# Patient Record
Sex: FEMALE | Race: WHITE
[De-identification: names, ages, dates, MRNs, and addresses within clinical notes are randomized per-mention and may not be internally consistent; named-entity substitution may affect disease eponyms.]

---

## 2020-10-25 ENCOUNTER — HOSPITAL ENCOUNTER (EMERGENCY)
Dept: HOSPITAL 95 - ER | Age: 70
Discharge: HOME | End: 2020-10-25
Payer: COMMERCIAL

## 2020-10-25 VITALS — WEIGHT: 150 LBS | BODY MASS INDEX: 24.11 KG/M2 | HEIGHT: 66 IN

## 2020-10-25 DIAGNOSIS — Z87.891: ICD-10-CM

## 2020-10-25 DIAGNOSIS — Z79.4: ICD-10-CM

## 2020-10-25 DIAGNOSIS — Z88.0: ICD-10-CM

## 2020-10-25 DIAGNOSIS — Z79.899: ICD-10-CM

## 2020-10-25 DIAGNOSIS — S52.502A: Primary | ICD-10-CM

## 2020-10-25 DIAGNOSIS — E11.9: ICD-10-CM
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
## 2020-10-25 DIAGNOSIS — S52.512A: ICD-10-CM

## 2020-10-25 DIAGNOSIS — Z88.2: ICD-10-CM

## 2020-10-25 PROCEDURE — A9270 NON-COVERED ITEM OR SERVICE: HCPCS

## 2020-10-29 ENCOUNTER — HOSPITAL ENCOUNTER (OUTPATIENT)
Dept: HOSPITAL 95 - ORSCSDS | Age: 70
Discharge: HOME | End: 2020-10-29
Attending: ORTHOPAEDIC SURGERY
Payer: COMMERCIAL

## 2020-10-29 VITALS — BODY MASS INDEX: 23.49 KG/M2 | WEIGHT: 146.17 LBS | HEIGHT: 65.98 IN

## 2020-10-29 DIAGNOSIS — G56.02: ICD-10-CM

## 2020-10-29 DIAGNOSIS — E11.9: ICD-10-CM

## 2020-10-29 DIAGNOSIS — S52.572A: Primary | ICD-10-CM

## 2020-10-29 DIAGNOSIS — E03.9: ICD-10-CM

## 2020-10-29 DIAGNOSIS — Z79.899: ICD-10-CM

## 2020-10-29 DIAGNOSIS — Z79.84: ICD-10-CM

## 2020-10-29 DIAGNOSIS — Z79.4: ICD-10-CM

## 2020-10-29 PROCEDURE — C1713 ANCHOR/SCREW BN/BN,TIS/BN: HCPCS

## 2020-10-29 PROCEDURE — 01N50ZZ RELEASE MEDIAN NERVE, OPEN APPROACH: ICD-10-PCS | Performed by: ORTHOPAEDIC SURGERY

## 2020-10-29 PROCEDURE — A9270 NON-COVERED ITEM OR SERVICE: HCPCS

## 2020-10-29 PROCEDURE — 0PSJ04Z REPOSITION LEFT RADIUS WITH INTERNAL FIXATION DEVICE, OPEN APPROACH: ICD-10-PCS | Performed by: ORTHOPAEDIC SURGERY

## 2020-10-29 NOTE — NUR
10/29/20 0914 KRISTINA RAMIREZ
AARON NOTED , DR. HANSEN AND  NOTIFIED, VERBAL ORDER
OBTAINED BY DR. HERNANDEZ FOR 5 UNITS HUMULIN R SUB Q X1, MEDICATION
ADMINISTERED PER ORDERS

## 2021-07-29 ENCOUNTER — HOSPITAL ENCOUNTER (OUTPATIENT)
Dept: HOSPITAL 95 - ORSCSDS | Age: 71
Discharge: HOME | End: 2021-07-29
Attending: OPHTHALMOLOGY
Payer: COMMERCIAL

## 2021-07-29 VITALS — HEIGHT: 66 IN | BODY MASS INDEX: 22.75 KG/M2 | WEIGHT: 141.54 LBS

## 2021-07-29 DIAGNOSIS — Z87.891: ICD-10-CM

## 2021-07-29 DIAGNOSIS — B19.20: ICD-10-CM

## 2021-07-29 DIAGNOSIS — H25.11: Primary | ICD-10-CM

## 2021-07-29 DIAGNOSIS — Z79.4: ICD-10-CM

## 2021-07-29 DIAGNOSIS — K21.9: ICD-10-CM

## 2021-07-29 DIAGNOSIS — E11.9: ICD-10-CM

## 2021-07-29 DIAGNOSIS — F41.8: ICD-10-CM

## 2021-07-29 DIAGNOSIS — Z79.899: ICD-10-CM

## 2021-07-29 PROCEDURE — 08RJ3JZ REPLACEMENT OF RIGHT LENS WITH SYNTHETIC SUBSTITUTE, PERCUTANEOUS APPROACH: ICD-10-PCS | Performed by: OPHTHALMOLOGY

## 2021-07-29 PROCEDURE — V2632 POST CHMBR INTRAOCULAR LENS: HCPCS

## 2021-08-12 ENCOUNTER — HOSPITAL ENCOUNTER (OUTPATIENT)
Dept: HOSPITAL 95 - ORSCSDS | Age: 71
Discharge: HOME | End: 2021-08-12
Attending: OPHTHALMOLOGY
Payer: COMMERCIAL

## 2021-08-12 VITALS — BODY MASS INDEX: 22.57 KG/M2 | WEIGHT: 140.43 LBS | HEIGHT: 66 IN

## 2021-08-12 DIAGNOSIS — F41.8: ICD-10-CM

## 2021-08-12 DIAGNOSIS — H25.12: Primary | ICD-10-CM

## 2021-08-12 DIAGNOSIS — Z87.891: ICD-10-CM

## 2021-08-12 DIAGNOSIS — Z79.84: ICD-10-CM

## 2021-08-12 DIAGNOSIS — Z79.899: ICD-10-CM

## 2021-08-12 DIAGNOSIS — K21.9: ICD-10-CM

## 2021-08-12 DIAGNOSIS — E11.9: ICD-10-CM

## 2021-08-12 PROCEDURE — 08RK3JZ REPLACEMENT OF LEFT LENS WITH SYNTHETIC SUBSTITUTE, PERCUTANEOUS APPROACH: ICD-10-PCS | Performed by: OPHTHALMOLOGY

## 2021-08-12 PROCEDURE — V2632 POST CHMBR INTRAOCULAR LENS: HCPCS

## 2022-03-31 ENCOUNTER — HOSPITAL ENCOUNTER (EMERGENCY)
Dept: HOSPITAL 95 - ER | Age: 72
Discharge: HOME | End: 2022-03-31
Payer: COMMERCIAL

## 2022-03-31 VITALS — WEIGHT: 145.99 LBS | BODY MASS INDEX: 23.46 KG/M2 | HEIGHT: 66 IN

## 2022-03-31 DIAGNOSIS — S41.111A: ICD-10-CM

## 2022-03-31 DIAGNOSIS — Z88.2: ICD-10-CM

## 2022-03-31 DIAGNOSIS — Z79.84: ICD-10-CM

## 2022-03-31 DIAGNOSIS — Z79.4: ICD-10-CM

## 2022-03-31 DIAGNOSIS — R55: Primary | ICD-10-CM

## 2022-03-31 DIAGNOSIS — Z87.891: ICD-10-CM

## 2022-03-31 DIAGNOSIS — Z88.0: ICD-10-CM
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
## 2022-03-31 DIAGNOSIS — Z88.8: ICD-10-CM

## 2022-03-31 DIAGNOSIS — Z79.899: ICD-10-CM

## 2022-03-31 DIAGNOSIS — K92.2: ICD-10-CM

## 2022-03-31 DIAGNOSIS — D64.9: ICD-10-CM

## 2022-03-31 LAB
ALBUMIN SERPL BCP-MCNC: 2.7 G/DL (ref 3.4–5)
ALBUMIN/GLOB SERPL: 0.7 {RATIO} (ref 0.8–1.8)
ALT SERPL W P-5'-P-CCNC: 33 U/L (ref 12–78)
ANION GAP SERPL CALCULATED.4IONS-SCNC: 7 MMOL/L (ref 6–16)
AST SERPL W P-5'-P-CCNC: 34 U/L (ref 12–37)
BASOPHILS # BLD AUTO: 0.01 K/MM3 (ref 0–0.23)
BASOPHILS NFR BLD AUTO: 0 % (ref 0–2)
BILIRUB SERPL-MCNC: 0.6 MG/DL (ref 0.1–1)
BUN SERPL-MCNC: 12 MG/DL (ref 8–24)
CALCIUM SERPL-MCNC: 8.4 MG/DL (ref 8.5–10.1)
CHLORIDE SERPL-SCNC: 101 MMOL/L (ref 98–108)
CO2 SERPL-SCNC: 25 MMOL/L (ref 21–32)
CREAT SERPL-MCNC: 0.74 MG/DL (ref 0.4–1)
DEPRECATED RDW RBC AUTO: 41.5 FL (ref 35.1–46.3)
EOSINOPHIL # BLD AUTO: 0.05 K/MM3 (ref 0–0.68)
EOSINOPHIL NFR BLD AUTO: 2 % (ref 0–6)
ERYTHROCYTE [DISTWIDTH] IN BLOOD BY AUTOMATED COUNT: 12.8 % (ref 11.7–14.2)
FLUAV RNA SPEC QL NAA+PROBE: NEGATIVE
FLUBV RNA SPEC QL NAA+PROBE: NEGATIVE
GLOBULIN SER CALC-MCNC: 4.1 G/DL (ref 2.2–4)
GLUCOSE SERPL-MCNC: 253 MG/DL (ref 70–99)
HCT VFR BLD AUTO: 28.3 % (ref 33–51)
HGB BLD-MCNC: 9.5 G/DL (ref 11.5–16)
IMM GRANULOCYTES # BLD AUTO: 0.03 K/MM3 (ref 0–0.1)
IMM GRANULOCYTES NFR BLD AUTO: 1 % (ref 0–1)
LYMPHOCYTES # BLD AUTO: 0.53 K/MM3 (ref 0.84–5.2)
LYMPHOCYTES NFR BLD AUTO: 21 % (ref 21–46)
MCHC RBC AUTO-ENTMCNC: 33.6 G/DL (ref 31.5–36.5)
MCV RBC AUTO: 94 FL (ref 80–100)
MONOCYTES # BLD AUTO: 1.46 K/MM3 (ref 0.16–1.47)
MONOCYTES NFR BLD AUTO: 57 % (ref 4–13)
NEUTROPHILS # BLD AUTO: 0.5 K/MM3 (ref 1.96–9.15)
NEUTROPHILS NFR BLD AUTO: 19 % (ref 41–73)
NRBC # BLD AUTO: 0 K/MM3 (ref 0–0.02)
NRBC BLD AUTO-RTO: 0 /100 WBC (ref 0–0.2)
PLATELET # BLD AUTO: 107 K/MM3 (ref 150–400)
POTASSIUM SERPL-SCNC: 4.2 MMOL/L (ref 3.5–5.5)
PROT SERPL-MCNC: 6.8 G/DL (ref 6.4–8.2)
RSV RNA SPEC QL NAA+PROBE: NEGATIVE
SARS-COV-2 RNA RESP QL NAA+PROBE: NEGATIVE
SODIUM SERPL-SCNC: 133 MMOL/L (ref 136–145)

## 2022-03-31 PROCEDURE — C9113 INJ PANTOPRAZOLE SODIUM, VIA: HCPCS

## 2022-04-18 ENCOUNTER — HOSPITAL ENCOUNTER (OUTPATIENT)
Dept: HOSPITAL 95 - LAB SHORT | Age: 72
Discharge: HOME | End: 2022-04-18
Attending: NURSE PRACTITIONER
Payer: COMMERCIAL

## 2022-04-18 ENCOUNTER — HOSPITAL ENCOUNTER (OUTPATIENT)
Dept: HOSPITAL 95 - ER | Age: 72
Setting detail: OBSERVATION
LOS: 2 days | Discharge: HOME | End: 2022-04-20
Attending: INTERNAL MEDICINE | Admitting: HOSPITALIST
Payer: COMMERCIAL

## 2022-04-18 VITALS — BODY MASS INDEX: 23.1 KG/M2 | WEIGHT: 143.74 LBS | HEIGHT: 66 IN

## 2022-04-18 DIAGNOSIS — B19.20: ICD-10-CM

## 2022-04-18 DIAGNOSIS — K74.69: ICD-10-CM

## 2022-04-18 DIAGNOSIS — Z88.0: ICD-10-CM

## 2022-04-18 DIAGNOSIS — E78.5: ICD-10-CM

## 2022-04-18 DIAGNOSIS — L08.9: Primary | ICD-10-CM

## 2022-04-18 DIAGNOSIS — E03.9: ICD-10-CM

## 2022-04-18 DIAGNOSIS — F41.9: ICD-10-CM

## 2022-04-18 DIAGNOSIS — E87.1: ICD-10-CM

## 2022-04-18 DIAGNOSIS — E11.9: ICD-10-CM

## 2022-04-18 DIAGNOSIS — K62.5: ICD-10-CM

## 2022-04-18 DIAGNOSIS — Z88.2: ICD-10-CM

## 2022-04-18 DIAGNOSIS — K60.2: Primary | ICD-10-CM

## 2022-04-18 DIAGNOSIS — K64.4: ICD-10-CM

## 2022-04-18 DIAGNOSIS — Z88.8: ICD-10-CM

## 2022-04-18 DIAGNOSIS — Z79.4: ICD-10-CM

## 2022-04-18 DIAGNOSIS — D50.0: ICD-10-CM

## 2022-04-18 DIAGNOSIS — Z87.891: ICD-10-CM

## 2022-04-18 DIAGNOSIS — I10: ICD-10-CM

## 2022-04-18 LAB
ALBUMIN SERPL BCP-MCNC: 2.1 G/DL (ref 3.4–5)
ALBUMIN/GLOB SERPL: 0.3 {RATIO} (ref 0.8–1.8)
ALT SERPL W P-5'-P-CCNC: 17 U/L (ref 12–78)
ANION GAP SERPL CALCULATED.4IONS-SCNC: 5 MMOL/L (ref 6–16)
AST SERPL W P-5'-P-CCNC: 31 U/L (ref 12–37)
BASOPHILS # BLD: 0 K/MM3 (ref 0–0.23)
BASOPHILS NFR BLD: 0 % (ref 0–2)
BILIRUB SERPL-MCNC: 1.1 MG/DL (ref 0.1–1)
BUN SERPL-MCNC: 15 MG/DL (ref 8–24)
CALCIUM SERPL-MCNC: 7.9 MG/DL (ref 8.5–10.1)
CHLORIDE SERPL-SCNC: 99 MMOL/L (ref 98–108)
CO2 SERPL-SCNC: 23 MMOL/L (ref 21–32)
CREAT SERPL-MCNC: 0.82 MG/DL (ref 0.4–1)
DEPRECATED RDW RBC AUTO: (no result) FL (ref 35.1–46.3)
EOSINOPHIL # BLD: 0 K/MM3 (ref 0–0.68)
EOSINOPHIL NFR BLD: 0 % (ref 0–6)
ERYTHROCYTE [DISTWIDTH] IN BLOOD BY AUTOMATED COUNT: (no result) % (ref 11.7–14.2)
GLOBULIN SER CALC-MCNC: 6.7 G/DL (ref 2.2–4)
GLUCOSE SERPL-MCNC: 120 MG/DL (ref 70–99)
HCT VFR BLD AUTO: 21.2 % (ref 33–51)
HGB BLD-MCNC: 7.5 G/DL (ref 11.5–16)
IMM GRANULOCYTES # BLD AUTO: 0.58 K/MM3 (ref 0–0.1)
IMM GRANULOCYTES NFR BLD AUTO: 9 % (ref 0–1)
LYMPHOCYTES # BLD: 0.89 K/MM3 (ref 0.84–5.2)
LYMPHOCYTES NFR BLD: 12 % (ref 21–46)
MCHC RBC AUTO-ENTMCNC: 35.4 G/DL (ref 31.5–36.5)
MCV RBC AUTO: 101 FL (ref 80–100)
METAMYELOCYTES # BLD MANUAL: 0.25 K/MM3 (ref 0–0)
METAMYELOCYTES NFR BLD MANUAL: 4 % (ref 0–0)
MONOCYTES # BLD: 0.5 K/MM3 (ref 0.16–1.47)
MONOCYTES NFR BLD: 8 % (ref 4–13)
MYELOCYTES # BLD MANUAL: 0.44 K/MM3 (ref 0–0)
MYELOCYTES NFR BLD MANUAL: 7 % (ref 0–0)
NEUTS BAND NFR BLD MANUAL: 5 % (ref 0–8)
NEUTS SEG # BLD MANUAL: 4.2 K/MM3 (ref 1.96–9.15)
NEUTS SEG NFR BLD MANUAL: 61 % (ref 41–73)
NRBC # BLD AUTO: 0 K/MM3 (ref 0–0.02)
NRBC BLD AUTO-RTO: 0 /100 WBC (ref 0–0.2)
PLATELET # BLD AUTO: 120 K/MM3 (ref 150–400)
POTASSIUM SERPL-SCNC: 4.5 MMOL/L (ref 3.5–5.5)
PROMYELOCYTES # BLD MANUAL: 0.06 K/MM3 (ref 0–0)
PROMYELOCYTES NFR BLD MANUAL: 1 % (ref 0–0)
PROT SERPL-MCNC: 8.8 G/DL (ref 6.4–8.2)
SODIUM SERPL-SCNC: 127 MMOL/L (ref 136–145)
TOTAL CELLS COUNTED BLD: 100
VARIANT LYMPHS NFR BLD MANUAL: 2 % (ref 0–0)

## 2022-04-18 PROCEDURE — A9270 NON-COVERED ITEM OR SERVICE: HCPCS

## 2022-04-18 PROCEDURE — G0378 HOSPITAL OBSERVATION PER HR: HCPCS

## 2022-04-18 PROCEDURE — C9113 INJ PANTOPRAZOLE SODIUM, VIA: HCPCS

## 2022-04-18 PROCEDURE — P9016 RBC LEUKOCYTES REDUCED: HCPCS

## 2022-04-19 LAB
ALBUMIN SERPL BCP-MCNC: 1.9 G/DL (ref 3.4–5)
ALBUMIN/GLOB SERPL: 0.3 {RATIO} (ref 0.8–1.8)
ALT SERPL W P-5'-P-CCNC: 15 U/L (ref 12–78)
ANION GAP SERPL CALCULATED.4IONS-SCNC: 6 MMOL/L (ref 6–16)
AST SERPL W P-5'-P-CCNC: 31 U/L (ref 12–37)
BILIRUB SERPL-MCNC: 1.1 MG/DL (ref 0.1–1)
BUN SERPL-MCNC: 12 MG/DL (ref 8–24)
CALCIUM SERPL-MCNC: 7.4 MG/DL (ref 8.5–10.1)
CHLORIDE SERPL-SCNC: 102 MMOL/L (ref 98–108)
CO2 SERPL-SCNC: 22 MMOL/L (ref 21–32)
CREAT SERPL-MCNC: 0.8 MG/DL (ref 0.4–1)
DEPRECATED RDW RBC AUTO: 44.1 FL (ref 35.1–46.3)
ERYTHROCYTE [DISTWIDTH] IN BLOOD BY AUTOMATED COUNT: 18.6 % (ref 11.7–14.2)
FLUAV RNA SPEC QL NAA+PROBE: NEGATIVE
FLUBV RNA SPEC QL NAA+PROBE: NEGATIVE
GLOBULIN SER CALC-MCNC: 6.1 G/DL (ref 2.2–4)
GLUCOSE SERPL-MCNC: 45 MG/DL (ref 70–99)
HCT VFR BLD AUTO: 19.8 % (ref 33–51)
HCT VFR BLD AUTO: 20.7 % (ref 33–51)
HGB BLD-MCNC: 6.9 G/DL (ref 11.5–16)
HGB BLD-MCNC: 7 G/DL (ref 11.5–16)
MCHC RBC AUTO-ENTMCNC: 33.8 G/DL (ref 31.5–36.5)
MCV RBC AUTO: 100 FL (ref 80–100)
NRBC # BLD AUTO: 0 K/MM3 (ref 0–0.02)
NRBC BLD AUTO-RTO: 0 /100 WBC (ref 0–0.2)
PLATELET # BLD AUTO: 121 K/MM3 (ref 150–400)
POTASSIUM SERPL-SCNC: 4.3 MMOL/L (ref 3.5–5.5)
PROT SERPL-MCNC: 8 G/DL (ref 6.4–8.2)
PROTHROMBIN TIME: 13.4 SEC (ref 9.7–11.5)
RSV RNA SPEC QL NAA+PROBE: NEGATIVE
SARS-COV-2 RNA RESP QL NAA+PROBE: NEGATIVE
SODIUM SERPL-SCNC: 130 MMOL/L (ref 136–145)

## 2022-04-19 PROCEDURE — 0DBP8ZX EXCISION OF RECTUM, VIA NATURAL OR ARTIFICIAL OPENING ENDOSCOPIC, DIAGNOSTIC: ICD-10-PCS | Performed by: INTERNAL MEDICINE

## 2022-04-19 NOTE — NUR
SHIFT SUMMARY
PATIENT IS AOX4, AMBULATES SBA TO THE BATHROOM. HERE FOR GI BLEED, GO LEONARD
STARTED @ 0830, FOR COLONOSCOPY. NOTIFIED DR. MEJIA @ NOON OF CLEAR STOOLS.
PATIENT MADE NPO FOR PROCEDURE. CBG CHECK AT 1630 WAS 31, DR. WATERS NOTIFIED.
NEW ORDERS FOR D50 1AMP NOW. PHARMACY CORRECTED DUE TO SHORTAGE. MEDICATION
GIVEN, RECHECK . PATIENT TAKEN TO PROCEDURE @1745. PATIENT IS PLEASANT
AND COOPERATIVE WITH CARE.

## 2022-04-19 NOTE — NUR
Rn summary:  Patient is alert and oriented.  Pt oriented and settled into
room.  Patient had a decub on her coccyx, states it is not from lying in bed,
she states she has a " sariah butt". Wound cleaned, mepilex applied.  Pt does
c/o some pain along her left side, back muscles to her shoulder. She  does not
take pain meds for that.  Pt has not had any bloody stools reported since
admit.  Lungs are clear.  Tele shows SR rate 97.  This am at 0615 report of
blood sugar of 45 received.  Pt given jello and juice, tolerated well.  Repeat
finger stick was 91 at 0645.  Plan is for colonoscopy this evening.  Pt
medicated with ativan 0.5mg for anxiety and muscle pain.  Pt was able to rest
between inturruptions.  Dr. Garcia was notified that pt had a difficult blood
type to cross match to.  It is a special send out and will most likely be a
high risk infusion. Oncoming shift notified.  Pt may need follow with social
services for help with  who has a brain mass.

## 2022-04-19 NOTE — NUR
PT TRANSFERED TO [facility] VIA GURNY FROM FLOOR.
History, Chart, Medications and Allergies reviewed before start of
procedure. Lungs clear T/O to Auscultation.
Patient confirms NPO status and agrees with scheduled surgery.
Pre-Op teaching done. Pt verbalizes understanding.

## 2022-04-19 NOTE — NUR
04/19/22 1804 WILLIAN SHINE
History, Chart, Medications and Allergies reviewed before start of
procedure. Patient confirms NPO status and agrees with scheduled
surgery. 3-LEAD EKG REVIEWED WITH PHYSICIAN PRIOR TO START OF
PROCEDURE. MONITOR INTACT WITH CONTINUOUS PULSE OXIMETRY AND
INTERMITTENT BP. PATIENT DETERMINED TO BE ASA APPROPRIATE FOR
PROPOFOL SEDATION PRIOR TO START OF PROCEDURE BY DR. MEJIA. 02 VIA POM

## 2022-04-20 NOTE — NUR
SUSPECTED BLOOD TRANSFUSION REACTION: LATE ENTRY: 1555
PATIENT AT HIGHER RISK FOR BLOOD TRANSFUSION. THEREFORE, INCREASED VITALS
FREQUENCY. PATIENT HAD A TEMPERATURE .8. OTHER VITALS STABLE. PATIENT
DENIES CHEST PAIN, SHORTNESS OR BREATH, FLANK PAIN, DIZZINESS, OR OTHER
DISCOMFORT. NO CHANGES TO PATIENT NEURO STATUS. NO CHANGES TO PATIENT'S SKIN
(NO RASH, BRUISING, OR SMALL RED DOTS ON SKIN). NOTIFIED DR. WATERS.
TRANSFUSION STOPPED AND PATIENT MEDICATED WITH TYLENOL. SUSPECTED TRANSFUSION
REACTION PROTOCOL COMPLETED. REMAINING BLOOD DELIVERED TO BLOOD BANK.
BEFORE PATIENT'S TEMPERATURE CAME DOWN, THE PATIENT'S TEMPERATURE INCREASED TO
101.1. VITALS CONTINUED TO BE STABLE. UPDATED DR. WATERS PERIODICALLY. FLUIDS
ORDERED AND HUNG FOR PATIENT. PATIENT CONTINUED TO HAVE STABLE VITALS, NO
CHANGES TO NEURO OR INTEGUMENTARY SYSTEM AND TO BE ABLE TO VOID. BY THE END OF
THE SHIFT, PATIENT'S TEMPERATURE TO 98.9.

## 2022-04-20 NOTE — NUR
END OF SHIFT SUMMARY/DISCHARGE SUMMARY:
PATIENT PAIN/DISCOMFORT CONTROLLED WITH LIDOCAINE PATCHES AND TYLENOL. PATIENT
DENIED NAUSEA/VOMITING. PATIENT CONTINUES TO HAVE DIARRHEA. NO BLOOD IN STOOL
NOTED. PATIENT TOLERATED SOME OF HER DINNER. PATIENT TRANSFUSED PART OF ONE
UNIT OF BLOOD TODAY. SUSPECTED TRANSFUSION REACTION (SEE NURSE'S NOTE). VITALS
CONTINUED TO BE STABLE AND PATIENT'S TEMPERATURE DOWN TO 98.9 BY THE END OF
SHIFT.  PATIENT REPORTED THAT SHE WAS NOT GOING TO STAY
ANOTHER NIGHT IN THE HOSPITAL. DR. WATERS NOTIFIED. PATIENT MONITORED FOR THE
REST OF THE SHIFT AND DISCHARGED IN THE EVENING. DISCHARGE RX FAXED TO [facility]
PER PATIENT REQUEST. DISCHARGE EDUCATION AND INSTRUCTIONS PROVIDED TO PATIENT
AND HER FRIEND. ALL QUESTIONS AND CONCERNS ADDRESSED. PATIENT DISCHARGED IN
WHEELCHAIR WITH CHARGE RN. PATIENT STABLE AT TIME OF DISCHARGE.
 PATIENT REPORTS CAREGIVER FATIGUE AND LACK OF SLEEP. PATIENT EXPRESSES HIGH
LEVELS OF FRUSTRATION AND ANXIETY IN REGARDS TO HER 'S SITUATION AND
DIFFICULTY CARING FOR (TERMINAL BRAIN CANCER). PATIENT HAS A STRONG SUPPORT
SYSTEM OF FRIENDS AND FAMILY.

## 2022-04-22 ENCOUNTER — HOSPITAL ENCOUNTER (EMERGENCY)
Dept: HOSPITAL 95 - ER | Age: 72
LOS: 1 days | Discharge: HOME | End: 2022-04-23
Payer: COMMERCIAL

## 2022-04-22 VITALS — HEIGHT: 67 IN | WEIGHT: 150 LBS | BODY MASS INDEX: 23.54 KG/M2

## 2022-04-22 DIAGNOSIS — Z88.8: ICD-10-CM

## 2022-04-22 DIAGNOSIS — Z88.0: ICD-10-CM

## 2022-04-22 DIAGNOSIS — Z79.899: ICD-10-CM

## 2022-04-22 DIAGNOSIS — Z88.2: ICD-10-CM

## 2022-04-22 DIAGNOSIS — Z87.891: ICD-10-CM

## 2022-04-22 DIAGNOSIS — Z79.4: ICD-10-CM

## 2022-04-22 DIAGNOSIS — Z79.84: ICD-10-CM

## 2022-04-22 DIAGNOSIS — R53.1: ICD-10-CM

## 2022-04-22 DIAGNOSIS — D64.9: Primary | ICD-10-CM

## 2022-04-22 LAB
ALBUMIN SERPL BCP-MCNC: 1.8 G/DL (ref 3.4–5)
ALBUMIN/GLOB SERPL: 0.3 {RATIO} (ref 0.8–1.8)
ALT SERPL W P-5'-P-CCNC: 19 U/L (ref 12–78)
ANION GAP SERPL CALCULATED.4IONS-SCNC: 10 MMOL/L (ref 6–16)
AST SERPL W P-5'-P-CCNC: 40 U/L (ref 12–37)
BASOPHILS # BLD: 0 K/MM3 (ref 0–0.23)
BASOPHILS NFR BLD: 0 % (ref 0–2)
BILIRUB SERPL-MCNC: 1 MG/DL (ref 0.1–1)
BUN SERPL-MCNC: 15 MG/DL (ref 8–24)
CALCIUM SERPL-MCNC: 8.8 MG/DL (ref 8.5–10.1)
CHLORIDE SERPL-SCNC: 96 MMOL/L (ref 98–108)
CO2 SERPL-SCNC: 19 MMOL/L (ref 21–32)
CREAT SERPL-MCNC: 0.83 MG/DL (ref 0.4–1)
DEPRECATED RDW RBC AUTO: 44.5 FL (ref 35.1–46.3)
EOSINOPHIL # BLD: 0.11 K/MM3 (ref 0–0.68)
EOSINOPHIL NFR BLD: 1 % (ref 0–6)
ERYTHROCYTE [DISTWIDTH] IN BLOOD BY AUTOMATED COUNT: 19.6 % (ref 11.7–14.2)
GLOBULIN SER CALC-MCNC: 7 G/DL (ref 2.2–4)
GLUCOSE SERPL-MCNC: 199 MG/DL (ref 70–99)
HCT VFR BLD AUTO: 19.8 % (ref 33–51)
HGB BLD-MCNC: 7.2 G/DL (ref 11.5–16)
LYMPHOCYTES # BLD: 1.31 K/MM3 (ref 0.84–5.2)
LYMPHOCYTES NFR BLD: 8 % (ref 21–46)
MCHC RBC AUTO-ENTMCNC: 36.4 G/DL (ref 31.5–36.5)
MCV RBC AUTO: 99 FL (ref 80–100)
METAMYELOCYTES # BLD MANUAL: 0.11 K/MM3 (ref 0–0)
METAMYELOCYTES NFR BLD MANUAL: 1 % (ref 0–0)
MONOCYTES # BLD: 0.59 K/MM3 (ref 0.16–1.47)
MONOCYTES NFR BLD: 5 % (ref 4–13)
NEUTS BAND NFR BLD MANUAL: 6 % (ref 0–8)
NEUTS SEG # BLD MANUAL: 9.79 K/MM3 (ref 1.96–9.15)
NEUTS SEG NFR BLD MANUAL: 76 % (ref 41–73)
NRBC # BLD AUTO: 0 K/MM3 (ref 0–0.02)
NRBC BLD AUTO-RTO: 0 /100 WBC (ref 0–0.2)
PLATELET # BLD AUTO: 91 K/MM3 (ref 150–400)
POTASSIUM SERPL-SCNC: 4.8 MMOL/L (ref 3.5–5.5)
PROT SERPL-MCNC: 8.8 G/DL (ref 6.4–8.2)
SODIUM SERPL-SCNC: 125 MMOL/L (ref 136–145)
TOTAL CELLS COUNTED BLD: 100
VARIANT LYMPHS NFR BLD MANUAL: 3 % (ref 0–0)

## 2022-04-26 ENCOUNTER — HOSPITAL ENCOUNTER (INPATIENT)
Dept: HOSPITAL 95 - ER | Age: 72
LOS: 3 days | DRG: 871 | End: 2022-04-29
Attending: HOSPITALIST | Admitting: HOSPITALIST
Payer: COMMERCIAL

## 2022-04-26 VITALS — WEIGHT: 165.79 LBS | BODY MASS INDEX: 26.64 KG/M2 | HEIGHT: 66 IN

## 2022-04-26 DIAGNOSIS — Z51.5: ICD-10-CM

## 2022-04-26 DIAGNOSIS — G92.8: ICD-10-CM

## 2022-04-26 DIAGNOSIS — J18.9: ICD-10-CM

## 2022-04-26 DIAGNOSIS — E87.1: ICD-10-CM

## 2022-04-26 DIAGNOSIS — I10: ICD-10-CM

## 2022-04-26 DIAGNOSIS — C85.90: ICD-10-CM

## 2022-04-26 DIAGNOSIS — K62.6: ICD-10-CM

## 2022-04-26 DIAGNOSIS — D63.0: ICD-10-CM

## 2022-04-26 DIAGNOSIS — E87.2: ICD-10-CM

## 2022-04-26 DIAGNOSIS — Z79.899: ICD-10-CM

## 2022-04-26 DIAGNOSIS — I97.618: ICD-10-CM

## 2022-04-26 DIAGNOSIS — D62: ICD-10-CM

## 2022-04-26 DIAGNOSIS — F41.9: ICD-10-CM

## 2022-04-26 DIAGNOSIS — K21.9: ICD-10-CM

## 2022-04-26 DIAGNOSIS — K74.60: ICD-10-CM

## 2022-04-26 DIAGNOSIS — Z98.890: ICD-10-CM

## 2022-04-26 DIAGNOSIS — Z79.4: ICD-10-CM

## 2022-04-26 DIAGNOSIS — Z88.0: ICD-10-CM

## 2022-04-26 DIAGNOSIS — Z66: ICD-10-CM

## 2022-04-26 DIAGNOSIS — A41.9: Primary | ICD-10-CM

## 2022-04-26 DIAGNOSIS — E87.5: ICD-10-CM

## 2022-04-26 DIAGNOSIS — E78.5: ICD-10-CM

## 2022-04-26 DIAGNOSIS — K92.2: ICD-10-CM

## 2022-04-26 DIAGNOSIS — E11.65: ICD-10-CM

## 2022-04-26 DIAGNOSIS — E11.622: ICD-10-CM

## 2022-04-26 DIAGNOSIS — B19.20: ICD-10-CM

## 2022-04-26 DIAGNOSIS — N17.9: ICD-10-CM

## 2022-04-26 DIAGNOSIS — R16.1: ICD-10-CM

## 2022-04-26 DIAGNOSIS — D69.6: ICD-10-CM

## 2022-04-26 DIAGNOSIS — D61.818: ICD-10-CM

## 2022-04-26 DIAGNOSIS — Z88.8: ICD-10-CM

## 2022-04-26 DIAGNOSIS — E03.9: ICD-10-CM

## 2022-04-26 DIAGNOSIS — I95.9: ICD-10-CM

## 2022-04-26 DIAGNOSIS — R65.21: ICD-10-CM

## 2022-04-26 DIAGNOSIS — Z88.2: ICD-10-CM

## 2022-04-26 LAB
ALBUMIN SERPL BCP-MCNC: 1.7 G/DL (ref 3.4–5)
ALBUMIN/GLOB SERPL: 0.2 {RATIO} (ref 0.8–1.8)
ALT SERPL W P-5'-P-CCNC: 17 U/L (ref 12–78)
ANION GAP SERPL CALCULATED.4IONS-SCNC: 17 MMOL/L (ref 6–16)
AST SERPL W P-5'-P-CCNC: 51 U/L (ref 12–37)
BILIRUB SERPL-MCNC: 1.1 MG/DL (ref 0.1–1)
BUN SERPL-MCNC: 47 MG/DL (ref 8–24)
CALCIUM SERPL-MCNC: 8.8 MG/DL (ref 8.5–10.1)
CHLORIDE SERPL-SCNC: 96 MMOL/L (ref 98–108)
CO2 SERPL-SCNC: 12 MMOL/L (ref 21–32)
CREAT SERPL-MCNC: 1.43 MG/DL (ref 0.4–1)
DEPRECATED RDW RBC AUTO: 47.8 FL (ref 35.1–46.3)
ERYTHROCYTE [DISTWIDTH] IN BLOOD BY AUTOMATED COUNT: 18.9 % (ref 11.7–14.2)
GLOBULIN SER CALC-MCNC: 6.8 G/DL (ref 2.2–4)
GLUCOSE SERPL-MCNC: 186 MG/DL (ref 70–99)
HCT VFR BLD AUTO: 18.2 % (ref 33–51)
HGB BLD-MCNC: 6.5 G/DL (ref 11.5–16)
MCHC RBC AUTO-ENTMCNC: 35.7 G/DL (ref 31.5–36.5)
MCV RBC AUTO: 100 FL (ref 80–100)
NRBC # BLD AUTO: 0.05 K/MM3 (ref 0–0.02)
NRBC BLD AUTO-RTO: 0.9 /100 WBC (ref 0–0.2)
PLATELET # BLD AUTO: 81 K/MM3 (ref 150–400)
POTASSIUM SERPL-SCNC: 6.4 MMOL/L (ref 3.5–5.5)
PROT SERPL-MCNC: 8.5 G/DL (ref 6.4–8.2)
SODIUM SERPL-SCNC: 125 MMOL/L (ref 136–145)
URN SPEC COLLECT METH UR: (no result)

## 2022-04-26 PROCEDURE — C1887 CATHETER, GUIDING: HCPCS

## 2022-04-26 PROCEDURE — A9560 TC99M LABELED RBC: HCPCS

## 2022-04-26 PROCEDURE — P9046 ALBUMIN (HUMAN), 25%, 20 ML: HCPCS

## 2022-04-26 PROCEDURE — C1725 CATH, TRANSLUMIN NON-LASER: HCPCS

## 2022-04-26 PROCEDURE — C1760 CLOSURE DEV, VASC: HCPCS

## 2022-04-26 PROCEDURE — C1769 GUIDE WIRE: HCPCS

## 2022-04-26 PROCEDURE — A9270 NON-COVERED ITEM OR SERVICE: HCPCS

## 2022-04-26 PROCEDURE — P9016 RBC LEUKOCYTES REDUCED: HCPCS

## 2022-04-26 PROCEDURE — C1751 CATH, INF, PER/CENT/MIDLINE: HCPCS

## 2022-04-26 PROCEDURE — C9113 INJ PANTOPRAZOLE SODIUM, VIA: HCPCS

## 2022-04-26 PROCEDURE — C1894 INTRO/SHEATH, NON-LASER: HCPCS

## 2022-04-27 LAB
ALBUMIN SERPL BCP-MCNC: 1.4 G/DL (ref 3.4–5)
ALBUMIN/GLOB SERPL: 0.2 {RATIO} (ref 0.8–1.8)
ALT SERPL W P-5'-P-CCNC: 15 U/L (ref 12–78)
ANION GAP SERPL CALCULATED.4IONS-SCNC: 10 MMOL/L (ref 6–16)
ANION GAP SERPL CALCULATED.4IONS-SCNC: 11 MMOL/L (ref 6–16)
ANION GAP SERPL CALCULATED.4IONS-SCNC: 11 MMOL/L (ref 6–16)
AST SERPL W P-5'-P-CCNC: 47 U/L (ref 12–37)
BASOPHILS # BLD: 0 K/MM3 (ref 0–0.23)
BASOPHILS # BLD: 0 K/MM3 (ref 0–0.23)
BASOPHILS NFR BLD: 0 % (ref 0–2)
BASOPHILS NFR BLD: 0 % (ref 0–2)
BILIRUB SERPL-MCNC: 1.5 MG/DL (ref 0.1–1)
BLASTS NFR BLD MANUAL: 2 % (ref 0–0)
BUN SERPL-MCNC: 48 MG/DL (ref 8–24)
BUN SERPL-MCNC: 51 MG/DL (ref 8–24)
BUN SERPL-MCNC: 52 MG/DL (ref 8–24)
CALCIUM SERPL-MCNC: 7.6 MG/DL (ref 8.5–10.1)
CALCIUM SERPL-MCNC: 7.7 MG/DL (ref 8.5–10.1)
CALCIUM SERPL-MCNC: 7.9 MG/DL (ref 8.5–10.1)
CHLORIDE SERPL-SCNC: 101 MMOL/L (ref 98–108)
CHLORIDE SERPL-SCNC: 102 MMOL/L (ref 98–108)
CHLORIDE SERPL-SCNC: 104 MMOL/L (ref 98–108)
CO2 SERPL-SCNC: 15 MMOL/L (ref 21–32)
CO2 SERPL-SCNC: 15 MMOL/L (ref 21–32)
CO2 SERPL-SCNC: 16 MMOL/L (ref 21–32)
CREAT SERPL-MCNC: 1.42 MG/DL (ref 0.4–1)
CREAT SERPL-MCNC: 1.48 MG/DL (ref 0.4–1)
CREAT SERPL-MCNC: 1.5 MG/DL (ref 0.4–1)
DEPRECATED RDW RBC AUTO: 46.8 FL (ref 35.1–46.3)
EOSINOPHIL # BLD: 0 K/MM3 (ref 0–0.68)
EOSINOPHIL # BLD: 0 K/MM3 (ref 0–0.68)
EOSINOPHIL NFR BLD: 0 % (ref 0–6)
EOSINOPHIL NFR BLD: 0 % (ref 0–6)
ERYTHROCYTE [DISTWIDTH] IN BLOOD BY AUTOMATED COUNT: 15.5 % (ref 11.7–14.2)
FERRITIN SERPL-MCNC: 497 NG/ML (ref 8–252)
GLOBULIN SER CALC-MCNC: 6 G/DL (ref 2.2–4)
GLUCOSE SERPL-MCNC: 172 MG/DL (ref 70–99)
GLUCOSE SERPL-MCNC: 173 MG/DL (ref 70–99)
GLUCOSE SERPL-MCNC: 181 MG/DL (ref 70–99)
HCT VFR BLD AUTO: 19.6 % (ref 33–51)
HCT VFR BLD AUTO: 19.6 % (ref 33–51)
HCT VFR BLD AUTO: 19.7 % (ref 33–51)
HCT VFR BLD AUTO: 20.3 % (ref 33–51)
HGB BLD-MCNC: 6.5 G/DL (ref 11.5–16)
HGB BLD-MCNC: 6.6 G/DL (ref 11.5–16)
HGB BLD-MCNC: 6.6 G/DL (ref 11.5–16)
HGB BLD-MCNC: 6.8 G/DL (ref 11.5–16)
KETONES UR STRIP-MCNC: (no result) MG/DL
KETONES UR STRIP-MCNC: (no result) MG/DL
LYMPHOCYTES # BLD: 1.2 K/MM3 (ref 0.84–5.2)
LYMPHOCYTES # BLD: 2.06 K/MM3 (ref 0.84–5.2)
LYMPHOCYTES NFR BLD: 21 % (ref 21–46)
LYMPHOCYTES NFR BLD: 41 % (ref 21–46)
MCHC RBC AUTO-ENTMCNC: 33.7 G/DL (ref 31.5–36.5)
MCV RBC AUTO: 97 FL (ref 80–100)
METAMYELOCYTES # BLD MANUAL: 0.05 K/MM3 (ref 0–0)
METAMYELOCYTES # BLD MANUAL: 0.18 K/MM3 (ref 0–0)
METAMYELOCYTES NFR BLD MANUAL: 1 % (ref 0–0)
METAMYELOCYTES NFR BLD MANUAL: 4 % (ref 0–0)
MONOCYTES # BLD: 0.73 K/MM3 (ref 0.16–1.47)
MONOCYTES # BLD: 0.85 K/MM3 (ref 0.16–1.47)
MONOCYTES NFR BLD: 15 % (ref 4–13)
MONOCYTES NFR BLD: 16 % (ref 4–13)
MYELOCYTES # BLD MANUAL: 0.11 K/MM3 (ref 0–0)
MYELOCYTES # BLD MANUAL: 0.45 K/MM3 (ref 0–0)
MYELOCYTES NFR BLD MANUAL: 10 % (ref 0–0)
MYELOCYTES NFR BLD MANUAL: 2 % (ref 0–0)
NEUTS BAND NFR BLD MANUAL: 13 % (ref 0–8)
NEUTS BAND NFR BLD MANUAL: 13 % (ref 0–8)
NEUTS SEG # BLD MANUAL: 1.05 K/MM3 (ref 1.96–9.15)
NEUTS SEG # BLD MANUAL: 3.48 K/MM3 (ref 1.96–9.15)
NEUTS SEG NFR BLD MANUAL: 10 % (ref 41–73)
NEUTS SEG NFR BLD MANUAL: 48 % (ref 41–73)
NRBC # BLD AUTO: 0.07 K/MM3 (ref 0–0.02)
NRBC BLD AUTO-RTO: 1.5 /100 WBC (ref 0–0.2)
PHOSPHATE SERPL-MCNC: 4 MG/DL (ref 2.5–4.9)
PHOSPHATE SERPL-MCNC: 4.6 MG/DL (ref 2.5–4.9)
PLATELET # BLD AUTO: 66 K/MM3 (ref 150–400)
POTASSIUM SERPL-SCNC: 5.5 MMOL/L (ref 3.5–5.5)
POTASSIUM SERPL-SCNC: 5.9 MMOL/L (ref 3.5–5.5)
POTASSIUM SERPL-SCNC: 6 MMOL/L (ref 3.5–5.5)
PROT SERPL-MCNC: 7.4 G/DL (ref 6.4–8.2)
PROT UR STRIP-MCNC: (no result) MG/DL
PROT UR STRIP-MCNC: (no result) MG/DL
RBC #/AREA URNS HPF: (no result) /HPF (ref 0–2)
RBC #/AREA URNS HPF: (no result) /HPF (ref 0–2)
SODIUM SERPL-SCNC: 126 MMOL/L (ref 136–145)
SODIUM SERPL-SCNC: 129 MMOL/L (ref 136–145)
SODIUM SERPL-SCNC: 130 MMOL/L (ref 136–145)
SP GR SPEC: 1.02 (ref 1–1.02)
SP GR SPEC: 1.02 (ref 1–1.02)
TIBC SERPL-MCNC: 145 UG/DL (ref 250–450)
TOTAL CELLS COUNTED BLD: 100
TOTAL CELLS COUNTED BLD: 100
URN SPEC COLLECT METH UR: (no result)
UROBILINOGEN UR STRIP-MCNC: (no result) MG/DL
UROBILINOGEN UR STRIP-MCNC: (no result) MG/DL
VARIANT LYMPHS NFR BLD MANUAL: 4 % (ref 0–0)
WBC #/AREA URNS HPF: (no result) /HPF (ref 0–5)

## 2022-04-27 PROCEDURE — B415YZZ FLUOROSCOPY OF INFERIOR MESENTERIC ARTERY USING OTHER CONTRAST: ICD-10-PCS | Performed by: RADIOLOGY

## 2022-04-27 PROCEDURE — 3E03329 INTRODUCTION OF OTHER ANTI-INFECTIVE INTO PERIPHERAL VEIN, PERCUTANEOUS APPROACH: ICD-10-PCS | Performed by: HOSPITALIST

## 2022-04-27 PROCEDURE — B414YZZ FLUOROSCOPY OF SUPERIOR MESENTERIC ARTERY USING OTHER CONTRAST: ICD-10-PCS | Performed by: HOSPITALIST

## 2022-04-27 PROCEDURE — 3E033XZ INTRODUCTION OF VASOPRESSOR INTO PERIPHERAL VEIN, PERCUTANEOUS APPROACH: ICD-10-PCS | Performed by: HOSPITALIST

## 2022-04-27 PROCEDURE — 30233N1 TRANSFUSION OF NONAUTOLOGOUS RED BLOOD CELLS INTO PERIPHERAL VEIN, PERCUTANEOUS APPROACH: ICD-10-PCS | Performed by: INTERNAL MEDICINE

## 2022-04-27 PROCEDURE — 0DJ08ZZ INSPECTION OF UPPER INTESTINAL TRACT, VIA NATURAL OR ARTIFICIAL OPENING ENDOSCOPIC: ICD-10-PCS | Performed by: INTERNAL MEDICINE

## 2022-04-27 PROCEDURE — 06HY33Z INSERTION OF INFUSION DEVICE INTO LOWER VEIN, PERCUTANEOUS APPROACH: ICD-10-PCS | Performed by: HOSPITALIST

## 2022-04-27 NOTE — NUR
REASSESSMENT
 
PT SLEEPY FROM SEDATATION FOR UPPER ENDOSCOPY, WHICH SHOWED NO BLEEDING. PT
SLIGHTLY HYPOTENSIVE POST PROCEDURE WITH SYSTOLIC PRESSURES IN THE 90'S. H/H
IMPROVED SLIGHTLY FROM THIS MORNING. PLAN FOR NM BLEEDING STUDY. NM REPORTS
MOST LIKELY WON'T BE ABLE TO BE COMPLETED UNTIL TOMORROW. NO OTHER ACUTE
CHANGES FROM PREVIOUS ASSESSMENT. SINUS RHYTHM ON THE MONITOR.

## 2022-04-27 NOTE — NUR
DR SAHNI NOTIFIED THAT BLOOD BANK DOESN'T HAVE A COMPATIBLE UNIT PRBC'S UNTIL
TOMORROW.
 
PT ATTEMPTED TO HAVE BM, ONLY SCANT AMOUNT OF MUCUS WAS NOTED. DID NOT APPEAR
TO BE BLOOD TINGED.

## 2022-04-27 NOTE — NUR
SHIFT SUMMARY
 
PT IS ORIENTED TO SELF AND PLACE AND YEAR. THIS AFTERNOON PT'S BP STARTED TO
DROP AND HR STARTED TO INCREASE. PT WAS STARTED ON LEVOPHED AND IS BEING
TITRATED FOR MAP >65. PICC LINE TO BE PLACED. PRBC AWAITING ARRIVAL TO
HOSPITAL, EARLIEST ARRIVAL IS TOMORROW PER BLOOD BANK. PT HAD UPPER SCOPE
TODAY WHICH DIDN'T REVEAL ANY BLEEDING. PT DID HAVE ONE SMALL BLACK TARRY BM
THIS AFTERNOON. PT TO HAVE NM TAG STUDY AROUND 0800 TOMORROW. CURRENT PLAN IS
TO TAKE PT TO CATH LAB TO LOOK FOR BLEEDING DUE TO DELAY OF BLOOD PRODUCTS AND
NM STUDY. H/H HAD SLIGHT DROP THIS AFTERNOON. PT'S POTASSIUM LEVEL STARTING TO
TREND DOWN AGAIN. MONITOR SHOWS PT TO BE IN SINUS TACH. OTHER VITALS HAVE
REMAINED STABLE.

## 2022-04-27 NOTE — NUR
RECEIVED PT FROM CATH LAB AT 1920, PT ARRIVES GROGGY BUT ANSWERING TO VOICE.
RIGHT UPPER ARM PICC SITE WITH NS @ 100ML AND LEVO @ 8MCG, SITE OOZING AND
REINFORCED IN CATH LAB WITH ADDITIONAL COBAN. LEFT UPPER ARM WITH PG. RIGHT
GROIN SITE, CLEAN/DRY/INTACT, HAM CATH PLACED, PT HOLLERING AT ANY MOVEMENT
AND/OR TOUCH. MOUTH MOISTENED, PT STILL SPEAKING WITH MUMBLING AND NOT MAKING
SENSE, SAID SHE DOES KNOW SHE IS AT THE HOSPITAL, JUST DOESN'T KNOW WHICH ONE.
SISTER IS IN THE HOUSE, BROUGHT IN TO ROOM, PT REPOSITIONED WITH MUCH
HOLLERING.

## 2022-04-27 NOTE — NUR
REASSESSMENT
 
PT RESTING ON BED, ORIENTED TO SELF AND LOCATION. YELLS OUT FREQUENTLY AND IS
ANXIOUS/AGITATED. WHEN PT IS AGITATED, HR WILL INCREASE 'S, THEN SETTLE
BACK DOWN TO 'S. BP'S HAVE IMPROVED POST SEDATION AND BACK TO BASELINE.
PT HAD SMALL, LOOSE BLACK TARRY BM. SINUS TACH ON THE MONITOR.

## 2022-04-27 NOTE — NUR
PT RECEIVED FROM ED AT 0206, EYES OPEN BUT SNORING. PT DIFFICULT TO AROUSE.
NURSE STATES HE JUST GAVE PATIENT DILAUDID FOR C/O PAIN, SHE HAD ALSO HAD THE
BENADRYL PROPHYLACTICALLY FOR THE BLOOD TRANSFUSION. PT COOL,DIAPHORETIC AND
CLAMMY. PT WITH GOOD LUNG SOUNDS, HEART TONES BOUNDING, PULSES PALP X 4, ABD
ROUND, TAUT, NO BOWEL SOUNDS HEARD. BLOOD AT EDGES OF PT'S LIPS. PT GIVEN
POWER GLIDE IN EVAN BY JOSSIE PITTS RN CHARGE NURSE. BLOOD DRAW COMPLETED, UNIT
OF BLOOD STARTED WITH CONCERN FROM BLOOD BANK TO WATCH PATIENT CAREFULLY. PT
CONTINUES TO BE VERY LETHARGIC AND DIFFICULT TO AROUSE. NARCAN GIVEN BY JOSSIE PITTS RN. PT ANSWERS IN FEW WORDS. LAB CALLS WITH CRITICAL LACTIC, RESULT
CALLED TO . PT CONTINUES WITH SONOROUS RESPIRATIONS. PT NOTED TO HAVE
2 QUARTER SIZED LESIONS TO HER GLUTEAL CREASE, PICTURES TAKEN. PT TOLERATING
BLOOD TRANSFUSION, TEMP, RESPIRATIONS, HEART RATE WNL. BP STABLE.

## 2022-04-27 NOTE — NUR
YURIDIA CONTINUES MOANING AND CRYING OUT. PT REPOSITIONED. SHE IS NOT ABLE TO
SWALLOW WATER, SHE IS HAVING A HARD TIME SIPPING THROUGH THE STRAW. CALL
PLACED TO  FOR PAIN MEDICATION NOT ORAL. ORDERS RECEIVED.

## 2022-04-27 NOTE — NUR
SUMMARY:
YURIDIA CONTINUES TO BE SONOROUS. SHE IS BREATHING EASY AND UNLABORED. SHE IS
MAINTAINING SATS >95%, HEART RATE LESS THAN 100, BP STABLE. ONE UNIT PRBC'S
HAS BEEN TRANSFUSED. SHE CONTINUES TO BE CLAMMY AND DIAPHORETIC. SHE AWAKENS
TO VOICE. NO EVIDENCE OF ACTIVE BLEEDING SINCE HER ARRIVAL. SHE CONTINUES TO
BE AFEBRILE. LABS TO BE DRAWN IN ROUGHLY 45 MINUTES FOR POST TRANSFUSION H/H.
WILL CONTINUE TO MONITOR, TREAT AND REPORT OFF TO NEXT SHIFT WHEN AVAILABLE.

## 2022-04-27 NOTE — NUR
ASSUMED CARE OF PT
 
PT RESTING ON BED, AWAKENS TO VOICE. INITIALLY CONFUSED ON LOCATION UPON
AWAKENING, THEN ABLE TO STATE SHES IN THE HOSPITAL. KNOWS YEAR, BUT NOT DATE.
PT ALSO AWAKENS SLIGHTLY AGITATED AND YELLING OUT. PT WILL FALL BACK ASLEEP
WHEN NOT INTERACTING WITH STAFF. NOC SHIFT COMPLETED 1 UNIT PRBC, H/H REMAIN
STABLE, BUT DIDN'T IMPROVE WITH UNIT.  NOTIFIED AND ORDERS WILL BE PLACED
FOR ADDITIONAL UNIT. VITALS STABLE AT THIS TIME. SINUS RHYTHM ON THE MONITOR.
PT NPO W/ IVF INFUSING.

## 2022-04-28 LAB
ALBUMIN SERPL BCP-MCNC: 1.5 G/DL (ref 3.4–5)
ALBUMIN SERPL BCP-MCNC: 1.7 G/DL (ref 3.4–5)
ANION GAP SERPL CALCULATED.4IONS-SCNC: 11 MMOL/L (ref 6–16)
ANION GAP SERPL CALCULATED.4IONS-SCNC: 13 MMOL/L (ref 6–16)
ANION GAP SERPL CALCULATED.4IONS-SCNC: 16 MMOL/L (ref 6–16)
BASOPHILS # BLD: 0 K/MM3 (ref 0–0.23)
BASOPHILS NFR BLD: 0 % (ref 0–2)
BLASTS NFR BLD MANUAL: 2 % (ref 0–0)
BUN SERPL-MCNC: 52 MG/DL (ref 8–24)
BUN SERPL-MCNC: 52 MG/DL (ref 8–24)
BUN SERPL-MCNC: 54 MG/DL (ref 8–24)
CALCIUM SERPL-MCNC: 7.4 MG/DL (ref 8.5–10.1)
CALCIUM SERPL-MCNC: 7.7 MG/DL (ref 8.5–10.1)
CALCIUM SERPL-MCNC: 8.1 MG/DL (ref 8.5–10.1)
CHLORIDE SERPL-SCNC: 103 MMOL/L (ref 98–108)
CHLORIDE SERPL-SCNC: 106 MMOL/L (ref 98–108)
CHLORIDE SERPL-SCNC: 107 MMOL/L (ref 98–108)
CO2 SERPL-SCNC: 11 MMOL/L (ref 21–32)
CO2 SERPL-SCNC: 14 MMOL/L (ref 21–32)
CO2 SERPL-SCNC: 16 MMOL/L (ref 21–32)
CREAT SERPL-MCNC: 1.56 MG/DL (ref 0.4–1)
CREAT SERPL-MCNC: 1.57 MG/DL (ref 0.4–1)
CREAT SERPL-MCNC: 1.66 MG/DL (ref 0.4–1)
DEPRECATED RDW RBC AUTO: 49.8 FL (ref 35.1–46.3)
EOSINOPHIL # BLD: 0.03 K/MM3 (ref 0–0.68)
EOSINOPHIL NFR BLD: 1 % (ref 0–6)
ERYTHROCYTE [DISTWIDTH] IN BLOOD BY AUTOMATED COUNT: 18.7 % (ref 11.7–14.2)
GLUCOSE SERPL-MCNC: 126 MG/DL (ref 70–99)
GLUCOSE SERPL-MCNC: 177 MG/DL (ref 70–99)
GLUCOSE SERPL-MCNC: 82 MG/DL (ref 70–99)
HCT VFR BLD AUTO: 19.5 % (ref 33–51)
HCT VFR BLD AUTO: 21.3 % (ref 33–51)
HGB BLD-MCNC: 7.1 G/DL (ref 11.5–16)
HGB BLD-MCNC: 7.4 G/DL (ref 11.5–16)
LYMPHOCYTES # BLD: 1.23 K/MM3 (ref 0.84–5.2)
LYMPHOCYTES NFR BLD: 40 % (ref 21–46)
MCHC RBC AUTO-ENTMCNC: 34.7 G/DL (ref 31.5–36.5)
MCV RBC AUTO: 99 FL (ref 80–100)
METAMYELOCYTES # BLD MANUAL: 0.21 K/MM3 (ref 0–0)
METAMYELOCYTES NFR BLD MANUAL: 7 % (ref 0–0)
MONOCYTES # BLD: 0.39 K/MM3 (ref 0.16–1.47)
MONOCYTES NFR BLD: 13 % (ref 4–13)
MYELOCYTES # BLD MANUAL: 0.42 K/MM3 (ref 0–0)
MYELOCYTES NFR BLD MANUAL: 14 % (ref 0–0)
NEUTS BAND NFR BLD MANUAL: 7 % (ref 0–8)
NEUTS SEG # BLD MANUAL: 0.63 K/MM3 (ref 1.96–9.15)
NEUTS SEG NFR BLD MANUAL: 14 % (ref 41–73)
NRBC # BLD AUTO: 0.13 K/MM3 (ref 0–0.02)
NRBC BLD AUTO-RTO: 4.3 /100 WBC (ref 0–0.2)
PHOSPHATE SERPL-MCNC: 4.1 MG/DL (ref 2.5–4.9)
PHOSPHATE SERPL-MCNC: 4.8 MG/DL (ref 2.5–4.9)
PLATELET # BLD AUTO: 40 K/MM3 (ref 150–400)
POTASSIUM SERPL-SCNC: 5.2 MMOL/L (ref 3.5–5.5)
POTASSIUM SERPL-SCNC: 5.6 MMOL/L (ref 3.5–5.5)
POTASSIUM SERPL-SCNC: 6 MMOL/L (ref 3.5–5.5)
PROMYELOCYTES # BLD MANUAL: 0.03 K/MM3 (ref 0–0)
PROMYELOCYTES NFR BLD MANUAL: 1 % (ref 0–0)
SODIUM SERPL-SCNC: 130 MMOL/L (ref 136–145)
SODIUM SERPL-SCNC: 133 MMOL/L (ref 136–145)
SODIUM SERPL-SCNC: 134 MMOL/L (ref 136–145)
TOTAL CELLS COUNTED BLD: 100
VARIANT LYMPHS NFR BLD MANUAL: 1 % (ref 0–0)

## 2022-04-28 NOTE — NUR
JUST SPOKE WITH KIRSTY, PT'S SISTER. SHE WAS ASKING HOW THE NIGHT WENT. I
EXPLAINED HOW SHE HAS BEEN AND TOLD HER THAT I WAS CONSIDERING HOLDING THE
PAIN MEDS TO SEE IF THAT IS WHAT IS MAKING HER ACT THIS WAY. SISTER SAID, NO!
SHE HAS BEEN THIS WAY BEFORE HOSPITALIZATION. PT MEDICATED FOR PAIN PER MAR.

## 2022-04-28 NOTE — NUR
YURIDIA IS RESTING QUIETLY, SISTER SLEEPING IN CHAIR BESIDE HER. HER RESPIRATORY
RATE IS DOWN TO 10-12, SATS REMAIN 92%, BP 80'S/40'S, TEMPERATURE HAS COME
DOWN. TOLERATED LAST TURN WITHOUT ANY HOLLERING.

## 2022-04-28 NOTE — NUR
ASSUMED CARE
REPORT FROM KAM LTUZ AT 0700. PT LAYING IN BED. YELLING OUT, INCOHERANT
SPEECH. UNABLE TO REDIRECT OR CALM. KEEPS EYES CLOSED. DOES NOT FOLLOW
COMMANDS. YELLING INCREASES c PALPATION TO ENTIRE BODY. UNABLE TO LOCALIZE OR
ASSESS PAIN. LUNGS CLEAR, 2L VIA NC, 95%. ST ON MONITOR. LEVO GTT FOR MAP >65.
PT PALE, DIAPHORETIC, TEMP 100.6. ABD DISTENDED, SLIGHTLY FIRM, BT X 4. HAM
PATENT, DRAINING CLEAR YELLOW URINE TO GRAVITY. RIGHT FEMORAL GROIN SITE. NO
BLEEDING, SWELLING OR HEMATOMA NOTED. DRESSING C/D/I. PICC TO RUE, POWERGLIDE
TO LUE. PLAN FOR NUC MED STUDY THIS SHIFT. WILL CONTINUE TO MONITOR.

## 2022-04-28 NOTE — NUR
RECEIVED REPORT FROM SISTER KIRSTY CERVANTES IN ROOM. PT LYING ON HER RIGHT SIDE
WITH SONOROUS RESPIRATIONS, LUNG CLEAR, O2 VIA NC @ 3L, LEVO @ 6MCG, NS @
75ML, HAM DRAINING TO GRAVITY, ABDOMEN QUIET, RIGHT GROIN SITE WITH
IMPROVEMENT IN THE SWELLING. DISCUSSED CARE WITH SISTER, PT'S TEMP CLIMBING,
ICE PACKS PLACED. SON IN LAW AND WIFE ARRIVE FOR VISIT. MAP >60, WILL CONTINUE
TO WATCH.

## 2022-04-28 NOTE — NUR
PT'S TEMPERATURE HAS STARTED TO COME DOWN, ICE BAGS REMAIN IN PLACE. PT HAS
DECREASING SATS, O2 INCREASED TO 5L VIA NC. BLOOD PRESSURE DECREASING, MAP
REMAINS ABOVE 60. HEART RATE 'S.

## 2022-04-28 NOTE — NUR
Case Conference Note
 
Pt's family has arrived. Escorted family to ICU waiting room per request from
family. Offered therapetuic listening and answered questions. Reviewed current
plan of care and discussed goals of care. Family reports Pt would not want a
biopsy and would not want to pursue treatment for cancer. Discussed comfort
care and hospice as an option. Educated on comfort care and hospice philosophy
with V/U made by family. Pt's spouse agrees Pt would want comfort care and DNR
status and states that he would like Pt's sister Veronica to make decision. Veronica
reports plan for another family member to arrive from [place] tomrrow
before placing Pt on comfort care. Family expresses appreciation and report no
other concerns at this time.
 
Spoke with Primary RN Madeleine and Charge RN. Relayed family's plan.
 
Spoke with Dr Sheikh and she reports she will come talk with family today.
 
Palliative Care will remain available.

## 2022-04-28 NOTE — NUR
YURIDIA CONTINUES CRYING OUT AND YELLING FOR MAMA, MUMBLING INCOHERENTLY, UNABLE
TO UNDERSTAND. SHE DOESN'T CONSOLE, SHE ISNT REDIRECTABLE. SHE IS MEDICATED
WITH PAIN MEDS PER MAR AND PT RESTS QUIETLY. THE UNIT OF BLOOD DID TRANSFUSE
WITH NO EVIDENT SEQUELAE. TEMP RISES SLIGHTLY. IV FLUIDS AT 150ML/HR, LEVO @
10MCG. HAM WITH DC RETURN. NO FURTHER CHANGES.

## 2022-04-28 NOTE — NUR
SHIFT SUMMARY
PT TO NUC MED AND CT THIS SHIFT. CT RESULTS SHOWED SEVERE LYMPHADENOPATHY.
ONCOLOGY CONSULTED. NUC MED SHOWED BLEED TO RIGHT GROIN SITE. DR STEEN
ASSESSED. HEMOSTATIS ACHIEVED c FEM STOP. FEM STOP REMOVED AFTER 30 MIN. NEURO
STATUS UNCHANGED. PT CONTINUES TO INTERMITTANTLY YELL OUT. DOES NOT FOLLOW
COMMANDS, NOT REDIRECTABLE. MEDICATED c FENTANYL PRN FOR PAIN. LEVO GTT
CONTINUES FOR MAP >65. ST, RATE 110'S. ABD SOFT, DISTENDED, HYPOACTIVE BT.
RIGHT GROIN SITE SOFT OTHER THAN PALPABLE LYMPH NODES. HAM PATENT, DRAINING
TO GRAVITY.
KIRSTY (SISTER) AND  MET c DR BROWN, DR STEEN, PALLIATIVE AND CARE
MANAGEMENT. KIRSTY SPOKE c DR REYES ON PHONE. GOALS OF CARE CHANGED TO COMFORT.
AWAITING KIRSTY'S DAUGHTER TOMORROW. IN THE MEANTIME, WILL CONTINUE PRESSORS AND
IVF. MAKE COMFORTABLE. WILL TRANSITION TO COMFORT CARE TOMORROW. CODE STATUS
CHANGED TO DNR.
WILL CONTINUE TO MONITOR UNTIL REPORT TO ONCOMING NURSE.

## 2022-04-28 NOTE — NUR
BLOOD ARRIVED, VERIFIED WITH BLOOD BANK, TO GO SLOW AS YESTERDAY. PT IS
ALREADY DIAPHORETIC, LABORED BREATHING AND TACHYCARDIC PRIOR TO BLOOD
STARTING. TRYING TO GET HER TO COMMUNICATE HER CONCERNS. SHE CONTINUES TO JUST
MOAN AND CRY OUT "MAMA". BLOOD HAS STARTED, NO CHANGES OBSERVED AT THIS TIME.
WILL CONTINUE TO MONITOR CLOSELY. NOTE: NASAL CANNULA AT 2L PLACED ABOUT 2230
FOR SATS DIPPING, NOT LOWER THAN 90%.

## 2022-04-28 NOTE — NUR
YURIDIA CONTINUES TO JUST YELL OUT, SHE IS NOT DIRECTABLE, WILL NOT ANSWER
QUESTIONS. WHEN REMINDED SHE IS IN THE HOSPITAL AND THAT SHE NEEDS TO QUIT
YELLING, SHE WILL SAY, "I KNOW". THEN SHE WILL CONTINUE TO YELL. SHE YELLS
WITH TURNS, SHE YELLS WHEN SHE IS TOUCHED, SHE JUST YELLS OUT FOR NO REASON.
ASKED IF SHE IS IN PAIN, ASKED IF SHE CAN USE WORDS TO TELL WHAT IS WRONG, SHE
CONTINUES TO MUMBLE AND YELL OUT FOR "MAMA". SHE HAS BEEN TURNED FREQUENTLY TO
KEEP HER OFF THOSE GLUTEAL CREASE WOUNDS, SHE CONTINUES TO BE DIAPHORETIC, BP
STABLE ON LEVOPHED @ 10MCG, HEART RATE REMAINS 110'S. SATS >96% WITH 02 VIA NC
@ 2L. HAM TO GRAVITY DRAINAGE WITH DC. SHE WEIGHS MORE TODAY AND HAS THE
EDEMA IN THE HANDS NOTED. BLOOD TRANSFUSED WITHOUT INCIDENT.

## 2022-04-29 NOTE — NUR
ASSUMED CARE
REPORT FROM KAM LUTZ AT 0700. PT RESTING IN BED. PT COMFORT CARE. PLAN TO
CONTINUE LEVOPHED AND IVF UNTIL DAUGHTER ARRIVES. PT LAYING c EYES CLOSED. NOT
RESPONSIVE TO VERBAL STIMULI. DOES NOT APPEAR IN DISTRESS. 5L VIA NC. ICE
PACKS AND FAN IN PLACE FOR ELEVATED TEMP. WILL CONTINUE TO MONITOR.

## 2022-04-29 NOTE — NUR
Supportive F/U visit. Pt resting in bed with Pt's sister Veronica at [facility]. Pt's
B/P and MAP continue to drop despite pressor being given. Veronica elects to stop
medications in order to allow Pt to pass peacefully and comfortably. Offered
emotional support for Veronica as Pt passes away. Veronica expresses appreciation and
is agreeable for  to visit.
 
Spoke with  Isra and discussed case.
 
Palliative Care will remain available.

## 2022-04-29 NOTE — NUR
YURIDIA WAS TURNED AGAIN, REPOSITIONED, MINIMAL YELLING, EYES POP OPEN BUT NOT
FOCUSING OR FOLLOWING VOICE. RETURNS TO SLEEP. TEMP 100.9, BP 93/49 MAP 62,
RESPS 8-12, HEART RATE 108. I/O 1000/200. LEVO @ 6MCG/NS @ 75ML. MOUTH CLOSES
AROUND SWABS WITH ORAL CARE. NO OTHER PURPOSEFUL MOVEMENTS. MEDICATED X 2 THIS
SHIFT WITH DILAUDID. SISTER HOME FOR THE NIGHT. WILL CONTINUE CURRENT TREAT-
MENT, AWAITING DAUGHTER WHO FLIES IN TODAY.

## 2022-04-29 NOTE — NUR
TOD
BP CONTINUED TO DECLINE THROUGHOUT THIS SHIFT. LEVO AT MAX OF 20 MCG/MIN.
SISTER OPTED TO STOP LEVO. LEVO PLACED ON STANDBY. PT MEDICATED FOR PAIN AND
ANXIETY. TOD 1722. PT TO BE TRANSPORTED TO [facility].

## 2022-04-29 NOTE — NUR
YURIDIA TURNED, MOUTH CARE DONE, ICE PACKS REAPPLIED, MEDICATED FOR PAIN.
MINIMAL YELLING THIS TURN. BREATHING IRREGULAR, TEMP 101.4, BP MAP=60, LEVO @
6MCG, NS @ 75ML/HR. SISTER HAS GONE HOME FOR NOW.

## 2022-04-29 NOTE — NUR
Spiritual Care EOL
Contacted by Palliative Care that Pt. had passed and that grieving sister
requested spiritual care. Pts. sister welcomed me. Established rapport and
facilitated a life review.  Pts. sister displayed evidence of responsiblity in
a very difficult family dynamic. Prayed with Pts. Sister and prayed for the
Pt. Facilitated more life review. [facility] has [facility] in
[place]. Updated ICU nurse Quita.